# Patient Record
Sex: FEMALE | Race: ASIAN | NOT HISPANIC OR LATINO | URBAN - METROPOLITAN AREA
[De-identification: names, ages, dates, MRNs, and addresses within clinical notes are randomized per-mention and may not be internally consistent; named-entity substitution may affect disease eponyms.]

---

## 2018-02-16 ENCOUNTER — EMERGENCY (EMERGENCY)
Facility: HOSPITAL | Age: 23
LOS: 1 days | Discharge: HOME | End: 2018-02-16
Admitting: EMERGENCY MEDICINE
Payer: MEDICAID

## 2018-02-16 VITALS
OXYGEN SATURATION: 100 % | DIASTOLIC BLOOD PRESSURE: 87 MMHG | TEMPERATURE: 99 F | HEART RATE: 85 BPM | RESPIRATION RATE: 16 BRPM | SYSTOLIC BLOOD PRESSURE: 125 MMHG

## 2018-02-16 DIAGNOSIS — S50.361A INSECT BITE (NONVENOMOUS) OF RIGHT ELBOW, INITIAL ENCOUNTER: ICD-10-CM

## 2018-02-16 DIAGNOSIS — S50.862A INSECT BITE (NONVENOMOUS) OF LEFT FOREARM, INITIAL ENCOUNTER: ICD-10-CM

## 2018-02-16 DIAGNOSIS — S90.561A INSECT BITE (NONVENOMOUS), RIGHT ANKLE, INITIAL ENCOUNTER: ICD-10-CM
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
## 2018-02-16 PROCEDURE — 99284 EMERGENCY DEPT VISIT MOD MDM: CPT

## 2018-02-16 RX ORDER — CEPHALEXIN 500 MG
500 CAPSULE ORAL ONCE
Qty: 0 | Refills: 0 | Status: COMPLETED | OUTPATIENT
Start: 2018-02-16 | End: 2018-02-16

## 2018-02-16 RX ORDER — KETOROLAC TROMETHAMINE 30 MG/ML
60 SYRINGE (ML) INJECTION ONCE
Qty: 0 | Refills: 0 | Status: DISCONTINUED | OUTPATIENT
Start: 2018-02-16 | End: 2018-02-16

## 2018-02-16 RX ORDER — CEPHALEXIN 500 MG
1 CAPSULE ORAL
Qty: 28 | Refills: 0 | OUTPATIENT
Start: 2018-02-16 | End: 2018-02-22

## 2018-02-16 RX ADMIN — Medication 500 MILLIGRAM(S): at 11:58

## 2018-02-16 RX ADMIN — Medication 60 MILLIGRAM(S): at 11:58

## 2018-02-16 NOTE — ED PROVIDER NOTE - MEDICAL DECISION MAKING DETAILS
Pt afebrile, nontoxic, sitting comfortably. FROM of all joints. Supportive tx, Rx Keflex and re-eval in 2days. Strict return precautions reviewed with pt in which pt verbalizes understanding and agrees to.

## 2018-02-16 NOTE — ED PROVIDER NOTE - SKIN RASH DESCRIPTION
+ Erythematous, swollen, warm papule to right medial malleolus, posterior right elbow and dorsal aspect of left forearm. Nontender. FROM of all joints. No fluctuance, open wounds or streaking.

## 2018-02-16 NOTE — ED PROVIDER NOTE - OBJECTIVE STATEMENT
24 y/o F presents to ED c/o warm, pruritic lesions x 24 hours which she attributes as likely insect bites. She reports developing a red, swollen bump to her right ankle yesterday morning, followed by another red, swollen bump to her right elbow last night, and now waking up with another red, swollen bump to her left forearm. She states all 3 lesions are pruritic, warm and uncomfortable. She has not taken any medication prior to arrival. She did not actually see an insect bite or sting her.    Denies fever, chills, dizziness, headache, dysphagia, drooling, sore throat, SOB, wheezing, arthralgias or joint stiffness, open wounds or discharge

## 2018-02-17 ENCOUNTER — EMERGENCY (EMERGENCY)
Facility: HOSPITAL | Age: 23
LOS: 1 days | Discharge: HOME | End: 2018-02-17
Admitting: EMERGENCY MEDICINE
Payer: MEDICAID

## 2018-02-17 VITALS
HEART RATE: 86 BPM | DIASTOLIC BLOOD PRESSURE: 74 MMHG | OXYGEN SATURATION: 100 % | SYSTOLIC BLOOD PRESSURE: 113 MMHG | TEMPERATURE: 98 F | RESPIRATION RATE: 18 BRPM

## 2018-02-17 DIAGNOSIS — L03.115 CELLULITIS OF RIGHT LOWER LIMB: ICD-10-CM

## 2018-02-17 DIAGNOSIS — L03.114 CELLULITIS OF LEFT UPPER LIMB: ICD-10-CM

## 2018-02-17 DIAGNOSIS — L03.113 CELLULITIS OF RIGHT UPPER LIMB: ICD-10-CM

## 2018-02-17 DIAGNOSIS — Z79.2 LONG TERM (CURRENT) USE OF ANTIBIOTICS: ICD-10-CM

## 2018-02-17 DIAGNOSIS — L29.9 PRURITUS, UNSPECIFIED: ICD-10-CM

## 2018-02-17 LAB
ALBUMIN SERPL ELPH-MCNC: 3.7 G/DL — SIGNIFICANT CHANGE UP (ref 3.4–5)
ALP SERPL-CCNC: 58 U/L — SIGNIFICANT CHANGE UP (ref 40–120)
ALT FLD-CCNC: 16 U/L — SIGNIFICANT CHANGE UP (ref 12–42)
ANION GAP SERPL CALC-SCNC: 6 MMOL/L — LOW (ref 9–16)
AST SERPL-CCNC: 26 U/L — SIGNIFICANT CHANGE UP (ref 15–37)
BASOPHILS NFR BLD AUTO: 0.4 % — SIGNIFICANT CHANGE UP (ref 0–2)
BILIRUB SERPL-MCNC: 0.4 MG/DL — SIGNIFICANT CHANGE UP (ref 0.2–1.2)
BUN SERPL-MCNC: 11 MG/DL — SIGNIFICANT CHANGE UP (ref 7–23)
CALCIUM SERPL-MCNC: 8.7 MG/DL — SIGNIFICANT CHANGE UP (ref 8.5–10.5)
CHLORIDE SERPL-SCNC: 107 MMOL/L — SIGNIFICANT CHANGE UP (ref 96–108)
CO2 SERPL-SCNC: 26 MMOL/L — SIGNIFICANT CHANGE UP (ref 22–31)
CREAT SERPL-MCNC: 0.96 MG/DL — SIGNIFICANT CHANGE UP (ref 0.5–1.3)
EOSINOPHIL NFR BLD AUTO: 3.1 % — SIGNIFICANT CHANGE UP (ref 0–6)
GLUCOSE SERPL-MCNC: 112 MG/DL — HIGH (ref 70–99)
HCT VFR BLD CALC: 38.7 % — SIGNIFICANT CHANGE UP (ref 34.5–45)
HGB BLD-MCNC: 12.3 G/DL — SIGNIFICANT CHANGE UP (ref 11.5–15.5)
IMM GRANULOCYTES NFR BLD AUTO: 0.1 % — SIGNIFICANT CHANGE UP (ref 0–1.5)
LYMPHOCYTES # BLD AUTO: 19.4 % — SIGNIFICANT CHANGE UP (ref 13–44)
MCHC RBC-ENTMCNC: 27.9 PG — SIGNIFICANT CHANGE UP (ref 27–34)
MCHC RBC-ENTMCNC: 31.8 G/DL — LOW (ref 32–36)
MCV RBC AUTO: 87.8 FL — SIGNIFICANT CHANGE UP (ref 80–100)
MONOCYTES NFR BLD AUTO: 6.2 % — SIGNIFICANT CHANGE UP (ref 2–14)
NEUTROPHILS NFR BLD AUTO: 70.8 % — SIGNIFICANT CHANGE UP (ref 43–77)
PLATELET # BLD AUTO: 267 K/UL — SIGNIFICANT CHANGE UP (ref 150–400)
POTASSIUM SERPL-MCNC: 5.4 MMOL/L — HIGH (ref 3.5–5.3)
POTASSIUM SERPL-SCNC: 5.4 MMOL/L — HIGH (ref 3.5–5.3)
PROT SERPL-MCNC: 6.9 G/DL — SIGNIFICANT CHANGE UP (ref 6.4–8.2)
RBC # BLD: 4.41 M/UL — SIGNIFICANT CHANGE UP (ref 3.8–5.2)
RBC # FLD: 14.6 % — SIGNIFICANT CHANGE UP (ref 10.3–16.9)
SODIUM SERPL-SCNC: 139 MMOL/L — SIGNIFICANT CHANGE UP (ref 132–145)
WBC # BLD: 7.2 K/UL — SIGNIFICANT CHANGE UP (ref 3.8–10.5)
WBC # FLD AUTO: 7.2 K/UL — SIGNIFICANT CHANGE UP (ref 3.8–10.5)

## 2018-02-17 PROCEDURE — 99284 EMERGENCY DEPT VISIT MOD MDM: CPT

## 2018-02-17 RX ORDER — DIPHENHYDRAMINE HCL 50 MG
1 CAPSULE ORAL
Qty: 15 | Refills: 0 | OUTPATIENT
Start: 2018-02-17 | End: 2018-02-21

## 2018-02-17 RX ORDER — IBUPROFEN 200 MG
600 TABLET ORAL ONCE
Qty: 0 | Refills: 0 | Status: COMPLETED | OUTPATIENT
Start: 2018-02-17 | End: 2018-02-17

## 2018-02-17 RX ORDER — DIPHENHYDRAMINE HCL 50 MG
50 CAPSULE ORAL EVERY 6 HOURS
Qty: 0 | Refills: 0 | Status: DISCONTINUED | OUTPATIENT
Start: 2018-02-17 | End: 2018-02-21

## 2018-02-17 RX ADMIN — Medication 600 MILLIGRAM(S): at 10:57

## 2018-02-17 RX ADMIN — Medication 600 MILLIGRAM(S): at 09:46

## 2018-02-17 RX ADMIN — Medication 50 MILLIGRAM(S): at 09:46

## 2018-02-17 RX ADMIN — Medication 50 MILLIGRAM(S): at 10:57

## 2018-02-17 NOTE — ED PROVIDER NOTE - SKIN, MLM
Approximately 3cm indurated, erythematous area to left proximal ankle and slight supraorbital erythema. + Erythematous, swollen, warm papule to right medial malleolus, posterior right elbow and dorsal aspect of left forearm. Nontender. FROM of all joints. No fluctuance, open wounds or streaking.

## 2018-02-17 NOTE — ED ADULT TRIAGE NOTE - CHIEF COMPLAINT QUOTE
Pt was seen yesterday for cellulitis and given keflex. Pt returns today because area to left forearm has become more red and "hard." Pt also has new swelling to her left eye and right ankle.

## 2018-02-17 NOTE — ED ADULT NURSE NOTE - OBJECTIVE STATEMENT
Pt was seen in ED yesterday after she noted redness and swelling to right elbow, left wrist, and right ankle. Pt was prescribed kelfex and discharged. Pt returns today with swelling to left eye. Pt also reports redness to left wrist and right elbow now feels hard. Pt denies fevers, chills. Pt reports she recently moved and there is a cat, no known allergies, denies knowingly been bite by anything

## 2018-02-17 NOTE — ED PROVIDER NOTE - OBJECTIVE STATEMENT
22 y/o F presents to ED c/o warm, pruritic lesions x 48 hours which she attributes as likely insect bites. She reports developing a red, swollen bump to her right ankle followed by another red, swollen bump to her right elbow yesterday, and another red, swollen bump to her left forearm over the past 2 days. She states all 3 lesions are pruritic, warm and uncomfortable. She has not taken any medication prior to arrival. She did not actually see an insect bite or sting her. Pt in ED yesterday for symptoms and states that all areas present yesterday seem less red and painful today. However has a new approximately 3cm indurated, erythematous area to left proximal ankle and slight supraorbital erythema. No eye pain, no discharge, no visual changes, no HAs. 24 y/o F presents to ED c/o warm, pruritic lesions x 48 hours which she attributes as likely insect bites. She reports developing a red, swollen bump to her right ankle followed by another red, swollen bump to her right elbow yesterday, and another red, swollen bump to her left forearm over the past 2 days. She states all 3 lesions are pruritic, warm and uncomfortable. She has not taken any medication prior to arrival. She did not actually see an insect bite or sting her. Pt in ED yesterday for symptoms and states that all areas present yesterday seem less red and painful today. However has a new approximately 3cm indurated, erythematous area to left proximal ankle and slight supraorbital erythema. No eye pain, no discharge, no visual changes, no HAs. Denies fever, chills, dizziness, headache, sore throat, SOB, wheezing.

## 2018-02-17 NOTE — ED PROVIDER NOTE - MEDICAL DECISION MAKING DETAILS
insect bites vs early cellulitis, FROM of all joints, afebrile and with no petechiae or purpura; advised close derm f/u, will add steroids, benadryl in addition to keflex. per pt lesions are improved from yesterday.. no ocular involvement

## 2022-01-01 NOTE — ED PROVIDER NOTE - CHIEF COMPLAINT
Central Venous Line Procedure      Pt required CVC insertion for medications, possible blood transfusions and frequent lab draws.      Consent: Patient/family educated about procedure, the risks & benefits, questions answered, verbal & written informed consent obtained.     Timeout: Completed prior to initiation of procedure.     Medications: Patient was given the following medications: General anesthesia on the ventilator      Line Placed: A 2.5 Fr.  2.5 cm single Lumen central venous line was placed into the right femoral vein.    Approach:  The patient was prepped and drapped in usual sterile fashion using full barrier technique.  1 attempts were needed using ultrasound guidance to successfully place the line via modified Seldinger technique. Ports demonstrated blood return and flushed without resistance with a minimum of 5ml of 0.9% NS. Secured with suture, a Biopatch, and a Tegaderm.       All materials, including the wire(s) were accounted for.  No complications.        CCT:   40 min       The patient is a 23y Female complaining of bite, insect.

## 2024-05-11 NOTE — ED PROVIDER NOTE - ATTESTATION, MLM
Shalonda Mccabe is a 4 year old female presenting to the walk-in clinic today for cough, throat pain, runny nose for the past 2 days.  Last Tylenol at 9pm last night  No change in eating or drinking  Pt acting age appropriate     Swabs/Specimens collected during rooming process:  None      PPE worn during room process  Writer: mask Patient: none     Patient would like communication of their results via:      Cell Phone:   Telephone Information:   Mobile [phone]     Okay to leave a message containing results? Yes   I have reviewed and confirmed nurses' notes for patient's medications, allergies, medical history, and surgical history.